# Patient Record
Sex: FEMALE | URBAN - METROPOLITAN AREA
[De-identification: names, ages, dates, MRNs, and addresses within clinical notes are randomized per-mention and may not be internally consistent; named-entity substitution may affect disease eponyms.]

---

## 2020-06-26 ENCOUNTER — APPOINTMENT (RX ONLY)
Dept: URBAN - METROPOLITAN AREA CLINIC 66 | Facility: CLINIC | Age: 70
Setting detail: DERMATOLOGY
End: 2020-06-26

## 2020-06-26 DIAGNOSIS — L82.1 OTHER SEBORRHEIC KERATOSIS: ICD-10-CM

## 2020-06-26 DIAGNOSIS — L85.3 XEROSIS CUTIS: ICD-10-CM

## 2020-06-26 DIAGNOSIS — Z71.89 OTHER SPECIFIED COUNSELING: ICD-10-CM

## 2020-06-26 DIAGNOSIS — L81.4 OTHER MELANIN HYPERPIGMENTATION: ICD-10-CM

## 2020-06-26 DIAGNOSIS — D18.0 HEMANGIOMA: ICD-10-CM

## 2020-06-26 PROBLEM — D18.01 HEMANGIOMA OF SKIN AND SUBCUTANEOUS TISSUE: Status: ACTIVE | Noted: 2020-06-26

## 2020-06-26 PROCEDURE — ? FULL BODY SKIN EXAM

## 2020-06-26 PROCEDURE — 99203 OFFICE O/P NEW LOW 30 MIN: CPT

## 2020-06-26 PROCEDURE — ? COUNSELING

## 2020-06-26 ASSESSMENT — LOCATION DETAILED DESCRIPTION DERM
LOCATION DETAILED: LEFT SUPERIOR MEDIAL MIDBACK
LOCATION DETAILED: LEFT INFERIOR MEDIAL UPPER BACK
LOCATION DETAILED: LEFT MEDIAL UPPER BACK
LOCATION DETAILED: LEFT INFERIOR MEDIAL MIDBACK

## 2020-06-26 ASSESSMENT — LOCATION SIMPLE DESCRIPTION DERM
LOCATION SIMPLE: LEFT LOWER BACK
LOCATION SIMPLE: LEFT UPPER BACK

## 2020-06-26 ASSESSMENT — LOCATION ZONE DERM: LOCATION ZONE: TRUNK

## 2020-06-26 NOTE — PROCEDURE: MIPS QUALITY
Quality 226: Preventive Care And Screening: Tobacco Use: Screening And Cessation Intervention: Patient screened for tobacco use and is an ex/non-smoker
Detail Level: Detailed
Quality 111:Pneumonia Vaccination Status For Older Adults: Pneumococcal Vaccination Previously Received
Quality 110: Preventive Care And Screening: Influenza Immunization: Influenza Immunization Administered during Influenza season
Quality 431: Preventive Care And Screening: Unhealthy Alcohol Use - Screening: Patient screened for unhealthy alcohol use using a single question and scores less than 2 times per year
Quality 130: Documentation Of Current Medications In The Medical Record: Current Medications Documented

## 2021-10-15 ENCOUNTER — APPOINTMENT (RX ONLY)
Dept: URBAN - METROPOLITAN AREA CLINIC 66 | Facility: CLINIC | Age: 71
Setting detail: DERMATOLOGY
End: 2021-10-15

## 2021-10-15 DIAGNOSIS — Z71.89 OTHER SPECIFIED COUNSELING: ICD-10-CM

## 2021-10-15 DIAGNOSIS — D18.0 HEMANGIOMA: ICD-10-CM

## 2021-10-15 DIAGNOSIS — L81.4 OTHER MELANIN HYPERPIGMENTATION: ICD-10-CM

## 2021-10-15 DIAGNOSIS — L82.1 OTHER SEBORRHEIC KERATOSIS: ICD-10-CM

## 2021-10-15 DIAGNOSIS — L85.3 XEROSIS CUTIS: ICD-10-CM

## 2021-10-15 PROBLEM — D18.01 HEMANGIOMA OF SKIN AND SUBCUTANEOUS TISSUE: Status: ACTIVE | Noted: 2021-10-15

## 2021-10-15 PROCEDURE — 99213 OFFICE O/P EST LOW 20 MIN: CPT

## 2021-10-15 PROCEDURE — ? TREATMENT REGIMEN

## 2021-10-15 PROCEDURE — ? FULL BODY SKIN EXAM

## 2021-10-15 PROCEDURE — ? COUNSELING

## 2021-10-15 ASSESSMENT — LOCATION SIMPLE DESCRIPTION DERM
LOCATION SIMPLE: LEFT UPPER BACK
LOCATION SIMPLE: LEFT LOWER BACK

## 2021-10-15 ASSESSMENT — LOCATION DETAILED DESCRIPTION DERM
LOCATION DETAILED: LEFT SUPERIOR MEDIAL MIDBACK
LOCATION DETAILED: LEFT INFERIOR MEDIAL MIDBACK
LOCATION DETAILED: LEFT INFERIOR MEDIAL UPPER BACK
LOCATION DETAILED: LEFT MEDIAL UPPER BACK

## 2021-10-15 ASSESSMENT — LOCATION ZONE DERM: LOCATION ZONE: TRUNK

## 2021-10-15 NOTE — HPI: EVALUATION OF SKIN LESION(S)
What Type Of Note Output Would You Prefer (Optional)?: Standard Output
Hpi Title: Evaluation of Skin Lesions
Additional History: \\n\\nAnnual full body skin exam, patient has a history of actinic keratoses

## 2021-12-07 ENCOUNTER — APPOINTMENT (RX ONLY)
Dept: URBAN - METROPOLITAN AREA CLINIC 66 | Facility: CLINIC | Age: 71
Setting detail: DERMATOLOGY
End: 2021-12-07

## 2021-12-07 DIAGNOSIS — L259 CONTACT DERMATITIS AND OTHER ECZEMA, UNSPECIFIED CAUSE: ICD-10-CM | Status: INADEQUATELY CONTROLLED

## 2021-12-07 DIAGNOSIS — L81.4 OTHER MELANIN HYPERPIGMENTATION: ICD-10-CM

## 2021-12-07 DIAGNOSIS — D485 NEOPLASM OF UNCERTAIN BEHAVIOR OF SKIN: ICD-10-CM

## 2021-12-07 DIAGNOSIS — L85.3 XEROSIS CUTIS: ICD-10-CM

## 2021-12-07 DIAGNOSIS — L30.4 ERYTHEMA INTERTRIGO: ICD-10-CM

## 2021-12-07 PROBLEM — D48.5 NEOPLASM OF UNCERTAIN BEHAVIOR OF SKIN: Status: ACTIVE | Noted: 2021-12-07

## 2021-12-07 PROBLEM — L23.9 ALLERGIC CONTACT DERMATITIS, UNSPECIFIED CAUSE: Status: ACTIVE | Noted: 2021-12-07

## 2021-12-07 PROCEDURE — ? COUNSELING

## 2021-12-07 PROCEDURE — ? PRESCRIPTION

## 2021-12-07 PROCEDURE — ? FULL BODY SKIN EXAM - DECLINED

## 2021-12-07 PROCEDURE — 99214 OFFICE O/P EST MOD 30 MIN: CPT | Mod: 25

## 2021-12-07 PROCEDURE — ? TREATMENT REGIMEN

## 2021-12-07 PROCEDURE — 11102 TANGNTL BX SKIN SINGLE LES: CPT

## 2021-12-07 PROCEDURE — ? BIOPSY BY SHAVE METHOD

## 2021-12-07 RX ORDER — HYDROCORTISONE 25 MG/G
CREAM TOPICAL
Qty: 28.35 | Refills: 6 | Status: ERX | COMMUNITY
Start: 2021-12-07

## 2021-12-07 RX ORDER — NYSTATIN 100000 [USP'U]/G
OINTMENT TOPICAL
Qty: 30 | Refills: 6 | Status: ERX | COMMUNITY
Start: 2021-12-07

## 2021-12-07 RX ADMIN — NYSTATIN: 100000 OINTMENT TOPICAL at 00:00

## 2021-12-07 RX ADMIN — HYDROCORTISONE: 25 CREAM TOPICAL at 00:00

## 2021-12-07 ASSESSMENT — LOCATION SIMPLE DESCRIPTION DERM
LOCATION SIMPLE: GROIN
LOCATION SIMPLE: ABDOMEN
LOCATION SIMPLE: LEFT FOREHEAD
LOCATION SIMPLE: RIGHT CHEEK
LOCATION SIMPLE: LEFT CHEEK

## 2021-12-07 ASSESSMENT — LOCATION DETAILED DESCRIPTION DERM
LOCATION DETAILED: RIGHT CENTRAL MALAR CHEEK
LOCATION DETAILED: LEFT MEDIAL FOREHEAD
LOCATION DETAILED: RIGHT INFERIOR CENTRAL MALAR CHEEK
LOCATION DETAILED: LEFT SUPRAPUBIC SKIN
LOCATION DETAILED: PERIUMBILICAL SKIN
LOCATION DETAILED: LEFT INFERIOR MEDIAL MALAR CHEEK

## 2021-12-07 ASSESSMENT — LOCATION ZONE DERM
LOCATION ZONE: TRUNK
LOCATION ZONE: FACE

## 2021-12-07 NOTE — HPI: RASH
What Type Of Note Output Would You Prefer (Optional)?: Standard Output
How Severe Is Your Rash?: mild
Is This A New Presentation, Or A Follow-Up?: Rash
Additional History: PATIENT ALREADY RECEIVED COVID-19 VACCINE

## 2022-11-04 ENCOUNTER — APPOINTMENT (RX ONLY)
Dept: URBAN - METROPOLITAN AREA CLINIC 66 | Facility: CLINIC | Age: 72
Setting detail: DERMATOLOGY
End: 2022-11-04

## 2022-11-04 DIAGNOSIS — Z71.89 OTHER SPECIFIED COUNSELING: ICD-10-CM

## 2022-11-04 DIAGNOSIS — D18.0 HEMANGIOMA: ICD-10-CM | Status: STABLE

## 2022-11-04 DIAGNOSIS — L81.4 OTHER MELANIN HYPERPIGMENTATION: ICD-10-CM | Status: STABLE

## 2022-11-04 DIAGNOSIS — L82.1 OTHER SEBORRHEIC KERATOSIS: ICD-10-CM | Status: STABLE

## 2022-11-04 DIAGNOSIS — L85.3 XEROSIS CUTIS: ICD-10-CM | Status: STABLE

## 2022-11-04 DIAGNOSIS — D485 NEOPLASM OF UNCERTAIN BEHAVIOR OF SKIN: ICD-10-CM

## 2022-11-04 DIAGNOSIS — L82.0 INFLAMED SEBORRHEIC KERATOSIS: ICD-10-CM

## 2022-11-04 PROBLEM — D18.01 HEMANGIOMA OF SKIN AND SUBCUTANEOUS TISSUE: Status: ACTIVE | Noted: 2022-11-04

## 2022-11-04 PROBLEM — D48.5 NEOPLASM OF UNCERTAIN BEHAVIOR OF SKIN: Status: ACTIVE | Noted: 2022-11-04

## 2022-11-04 PROCEDURE — ? BIOPSY BY SHAVE METHOD

## 2022-11-04 PROCEDURE — ? TREATMENT REGIMEN

## 2022-11-04 PROCEDURE — ? LIQUID NITROGEN

## 2022-11-04 PROCEDURE — 17110 DESTRUCTION B9 LES UP TO 14: CPT

## 2022-11-04 PROCEDURE — 99213 OFFICE O/P EST LOW 20 MIN: CPT | Mod: 25

## 2022-11-04 PROCEDURE — ? FULL BODY SKIN EXAM

## 2022-11-04 PROCEDURE — 11102 TANGNTL BX SKIN SINGLE LES: CPT | Mod: 59

## 2022-11-04 PROCEDURE — ? COUNSELING

## 2022-11-04 PROCEDURE — ? SUNSCREEN RECOMMENDATIONS

## 2022-11-04 ASSESSMENT — LOCATION DETAILED DESCRIPTION DERM
LOCATION DETAILED: RIGHT INFERIOR LATERAL NECK
LOCATION DETAILED: LEFT INFERIOR MEDIAL UPPER BACK
LOCATION DETAILED: RIGHT CLAVICULAR NECK
LOCATION DETAILED: LEFT SUPERIOR MEDIAL MIDBACK
LOCATION DETAILED: LEFT INFERIOR LATERAL NECK
LOCATION DETAILED: LEFT MEDIAL UPPER BACK
LOCATION DETAILED: LEFT INFERIOR MEDIAL MIDBACK
LOCATION DETAILED: LEFT CLAVICULAR NECK
LOCATION DETAILED: LEFT LATERAL SUPERIOR CHEST

## 2022-11-04 ASSESSMENT — LOCATION SIMPLE DESCRIPTION DERM
LOCATION SIMPLE: RIGHT ANTERIOR NECK
LOCATION SIMPLE: LEFT UPPER BACK
LOCATION SIMPLE: LEFT ANTERIOR NECK
LOCATION SIMPLE: LEFT LOWER BACK
LOCATION SIMPLE: CHEST

## 2022-11-04 ASSESSMENT — LOCATION ZONE DERM
LOCATION ZONE: TRUNK
LOCATION ZONE: NECK

## 2022-11-04 NOTE — PROCEDURE: LIQUID NITROGEN
Render Post-Care Instructions In Note?: no
Show Aperture Variable?: Yes
Consent: The patient's consent was obtained including but not limited to risks of crusting, scabbing, blistering, scarring, darker or lighter pigmentary change, recurrence, incomplete removal and infection.
Spray Paint Text: The liquid nitrogen was applied to the skin utilizing a spray paint frosting technique.
Post-Care Instructions: I reviewed with the patient in detail post-care instructions. Patient is to wear sunprotection, and avoid picking at any of the treated lesions. Pt may apply Vaseline to crusted or scabbing areas.
Detail Level: Detailed
Medical Necessity Information: It is in your best interest to select a reason for this procedure from the list below. All of these items fulfill various CMS LCD requirements except the new and changing color options.
Medical Necessity Clause: This procedure was medically necessary because the lesions that were treated were:

## 2023-11-03 ENCOUNTER — APPOINTMENT (RX ONLY)
Dept: URBAN - METROPOLITAN AREA CLINIC 66 | Facility: CLINIC | Age: 73
Setting detail: DERMATOLOGY
End: 2023-11-03

## 2023-11-03 DIAGNOSIS — L82.1 OTHER SEBORRHEIC KERATOSIS: ICD-10-CM

## 2023-11-03 DIAGNOSIS — L85.3 XEROSIS CUTIS: ICD-10-CM

## 2023-11-03 DIAGNOSIS — D18.0 HEMANGIOMA: ICD-10-CM

## 2023-11-03 DIAGNOSIS — Z71.89 OTHER SPECIFIED COUNSELING: ICD-10-CM

## 2023-11-03 DIAGNOSIS — L81.4 OTHER MELANIN HYPERPIGMENTATION: ICD-10-CM

## 2023-11-03 PROBLEM — D18.01 HEMANGIOMA OF SKIN AND SUBCUTANEOUS TISSUE: Status: ACTIVE | Noted: 2023-11-03

## 2023-11-03 PROCEDURE — 99213 OFFICE O/P EST LOW 20 MIN: CPT

## 2023-11-03 PROCEDURE — ? COUNSELING

## 2023-11-03 PROCEDURE — ? FULL BODY SKIN EXAM

## 2023-11-03 PROCEDURE — ? TREATMENT REGIMEN

## 2023-11-03 ASSESSMENT — LOCATION SIMPLE DESCRIPTION DERM
LOCATION SIMPLE: LEFT UPPER BACK
LOCATION SIMPLE: LEFT LOWER BACK

## 2023-11-03 ASSESSMENT — LOCATION DETAILED DESCRIPTION DERM
LOCATION DETAILED: LEFT INFERIOR MEDIAL MIDBACK
LOCATION DETAILED: LEFT SUPERIOR MEDIAL MIDBACK
LOCATION DETAILED: LEFT MEDIAL UPPER BACK
LOCATION DETAILED: LEFT INFERIOR MEDIAL UPPER BACK

## 2023-11-03 ASSESSMENT — LOCATION ZONE DERM: LOCATION ZONE: TRUNK

## 2024-05-14 ENCOUNTER — APPOINTMENT (RX ONLY)
Age: 74
Setting detail: DERMATOLOGY
End: 2024-05-14

## 2024-05-14 ENCOUNTER — APPOINTMENT (OUTPATIENT)
Age: 74
Setting detail: DERMATOLOGY
End: 2024-05-14

## 2024-05-14 DIAGNOSIS — Z41.9 ENCOUNTER FOR PROCEDURE FOR PURPOSES OTHER THAN REMEDYING HEALTH STATE, UNSPECIFIED: ICD-10-CM

## 2024-05-14 PROCEDURE — ? DELUXE HYDRAFACIAL

## 2024-05-14 PROCEDURE — ? PRODUCT LINE (ZO SKIN HEALTH)

## 2024-05-14 PROCEDURE — ? PRODUCT LINE (SUNSCREENS)

## 2024-05-14 ASSESSMENT — LOCATION ZONE DERM
LOCATION ZONE: FACE
LOCATION ZONE: FACE

## 2024-05-14 ASSESSMENT — LOCATION SIMPLE DESCRIPTION DERM
LOCATION SIMPLE: LEFT FOREHEAD
LOCATION SIMPLE: LEFT CHEEK
LOCATION SIMPLE: RIGHT CHEEK
LOCATION SIMPLE: RIGHT CHEEK
LOCATION SIMPLE: INFERIOR FOREHEAD
LOCATION SIMPLE: LEFT CHEEK
LOCATION SIMPLE: INFERIOR FOREHEAD
LOCATION SIMPLE: LEFT FOREHEAD

## 2024-05-14 ASSESSMENT — LOCATION DETAILED DESCRIPTION DERM
LOCATION DETAILED: LEFT CENTRAL MALAR CHEEK
LOCATION DETAILED: INFERIOR MID FOREHEAD
LOCATION DETAILED: LEFT INFERIOR CENTRAL MALAR CHEEK
LOCATION DETAILED: INFERIOR MID FOREHEAD
LOCATION DETAILED: RIGHT CENTRAL MALAR CHEEK
LOCATION DETAILED: RIGHT INFERIOR CENTRAL MALAR CHEEK
LOCATION DETAILED: LEFT INFERIOR CENTRAL MALAR CHEEK
LOCATION DETAILED: LEFT INFERIOR MEDIAL FOREHEAD
LOCATION DETAILED: LEFT CENTRAL MALAR CHEEK
LOCATION DETAILED: RIGHT CENTRAL MALAR CHEEK
LOCATION DETAILED: LEFT INFERIOR MEDIAL FOREHEAD
LOCATION DETAILED: RIGHT INFERIOR CENTRAL MALAR CHEEK

## 2024-05-14 NOTE — PROCEDURE: DELUXE HYDRAFACIAL
Vacuum Pressure Low Setting (Will Not Render If Set To 0): 0
Indication: anti-aging
Solution: GlySal 7.5%
Tip: Hydropeel Tip, Teal
Solution Override
Treatment Number: 1
Comments: 1/6 package
Number Of Passes: 2
Consent: Written consent obtained, risks reviewed including but not limited to crusting, scabbing, blistering, scarring, darker or lighter pigmentary change, bruising, and/or incomplete response.
Location: face
Post-Care Instructions: I reviewed with the patient in detail post-care instructions. Patient should stay away from the sun and wear sun protection until treated areas are fully healed.
Procedure: Exfoliation
Solution: Beta-HD
Tip: Hydropeel Tip, Clear
Price (Use Numbers Only, No Special Characters Or $): 587
Tip Override

## 2024-05-14 NOTE — PROCEDURE: PRODUCT LINE (SUNSCREENS)
Product 72 Price (In Dollars - Numeric Only, No Special Characters Or $): 0.00
Product 1 Units: 0
Product 1 Application Directions: Apply QAM
Name Of Product 7: Colorscience SPF 50 Brush 3 pack
Name Of Product 21: BATOOL Gonzalez
Assigning Risk Information: Per AMA, level of risk is based upon consequences of the problem(s) addressed at the encounter when appropriately treated. Risk also includes medical decision making related to the need to initiate or forego further testing, treatment and/or hospitalization. Over the counter medication are assigned a risk level of low. Prescription medication management is assigned a risk level of moderate.
Product 12 Application Directions: Apply to entire face QAM
Name Of Product 10: Alastin Tinted SPF
Risk Of Complication Category: No MDM
Name Of Product 2: Colorscience Bronze blush
Name Of Product 13: ZO SPF Powder light
Product 4 Application Directions: Apply to entire face daily
Allow Plan To Count Towards E/M Coding: No (this will remove associated impression from E/M calculation)
Product 7 Application Directions: Apply to entire face QD PRN
Name Of Product 5: Colorscience Weinert
Product 10 Units: 1
Name Of Product 8: ZO Broad Spectrum SPF 50
Send Charges To Patient Encounter: No
Name Of Product 11: Christie Zhou SPF Powder
Name Of Product 3: Colorscience Total Eye
Name Of Product 14: Colorscience Flex SPF 50
Name Of Product 6: Colorscience Stick
Name Of Product 1: Colorscience SPF Shield Glow
Name Of Product 9: Colorscience Brush
Name Of Product 12: Alastin Hydrating SPF
Detail Level: Zone
Name Of Product 4: Colorscience DUO

## 2024-05-14 NOTE — PROCEDURE: PRODUCT LINE (ZO SKIN HEALTH)
Risk Of Complication Category: No MDM
Name Of Product 32: ZO Smart Tone SPF 50
Product 2 Price (In Dollars - Numeric Only, No Special Characters Or $): 0.00
Product 21 Units: 0
Product 34 Application Directions: Apply to entire face over DPD and right before SPF
Product 10 Application Directions: Apply to entire face BID as directed
Name Of Product 8: Exfoliating accelerator
Name Of Product 27: SPF Brush 40 medium
Name Of Product 35: Complex A+
Product 37 Application Directions: Apply BID
Product 13 Application Directions: Apply to entire face QHS as tolerated. Start with every other night.
Allow Plan To Count Towards E/M Coding: No (this will remove associated impression from E/M calculation)
Product 21 Application Directions: Swab BID after cleansing
Product 2 Units: 1
Product 29 Application Directions: Apply to entire face QAM and reapply as needed.
Name Of Product 38: ZO Sheer Fluid SPF 50
Name Of Product 11: Firming Serum
Product 2 Application Directions: Scrub 5 x a week after cleansing
Name Of Product 22: Eye Brightening
Product 16 Application Directions: Apply to entire face QHS as tolerated
Name Of Product 14: Dual Action Scrub
Name Of Product 30: Renewal cream
Product 24 Application Directions: Apply to entire face BID after cleansing
Product 18 Application Directions: Apply to entire face QAM
Send Charges To Patient Encounter: No
Product 5 Application Directions: Apply QAM
Name Of Product 3: Rozatrol
Name Of Product 17: Complexion renewal pads
Name Of Product 25: Hydrafirm / Brightening Eye Cream
Name Of Product 6: Daily Power Defense
Product 35 Application Directions: Apply to entire face QHS PRN
Name Of Product 19: Tretinoin 0.05% (20g)
Name Of Product 33: Complexion clearing mask
Name Of Product 9: Pigment HQ4% blending RX
Product 30 Application Directions: Apply to entire face at night alternating with Retinol
Name Of Product 36: Enzymatic peel
Name Of Product 28: Hydrating Cream
Product 14 Application Directions: Scrub Every other day after cleansing
Name Of Product 39: ZO Gel SPF 50
Name Of Product 12: Exfoliating polish travel size
Product 3 Application Directions: Apply to entire face BID after Daily Power Defense
Name Of Product 31: Wrinkle and Texture Repair
Name Of Product 15: Growth Factor Serum
Name Of Product 1: Gentle cleanser
Product 6 Application Directions: Apply to entire face twice daily after pads.
Product 25 Application Directions: Apply to periorbital region QHS
Name Of Product 4: Pigment control HQ4% RX
Product 19 Application Directions: Apply 2 x 3 nights a week as tolerated
Product 9 Application Directions: Apply HS
Product 33 Application Directions: 3 x week to T-zone
Product 22 Application Directions: Apply to eyelids and crows feet BID
Name Of Product 26: Gregoryve
Name Of Product 20: Retinol Brightener
Product 28 Application Directions: Apply to entire face QOS PRN alternating with Retin A
Name Of Product 7: Instant Pore refiner
Name Of Product 34: Illuminating AOX Serum
Product 36 Application Directions: Apply to entire face QHS
Detail Level: Zone
Name Of Product 10: Complexion Renewal Kit
Name Of Product 29: ZO Broad SPF 50
Name Of Product 23: Gentle cleanser travel size
Name Of Product 21: Oil Control Pads
Product 1 Application Directions: Wash BID
Product 15 Application Directions: Apply to face BID
Name Of Product 37: Eye Growth Factor Serum
Name Of Product 13: Radical night repair
Product 23 Application Directions: Cleanse face BID
Product 4 Application Directions: Apply to entire face for pigmentation QAM
Name Of Product 18: ZO SPF Primer
Name Of Product 40: Complexion Clarifying Serum
Name Of Product 2: Exfoliating polish
Assigning Risk Information: Per AMA, level of risk is based upon consequences of the problem(s) addressed at the encounter when appropriately treated. Risk also includes medical decision making related to the need to initiate or forego further testing, treatment and/or hospitalization. Over the counter medication are assigned a risk level of low. Prescription medication management is assigned a risk level of moderate.
Name Of Product 24: Calming toner
Name Of Product 16: Wrinkle Texture Repair
Name Of Product 41: Intense eye repair
Product 7 Application Directions: Apply to entire face BID
Name Of Product 5: Vitamin C serum
Product 26 Application Directions: Apply to entire face BID after pads

## 2024-05-14 NOTE — PROCEDURE: DELUXE HYDRAFACIAL
Vacuum Pressure Low Setting (Will Not Render If Set To 0): 0
Vacuum Pressure Low Setting (Will Not Render If Set To 0): 1
Tip: Hydropeel Tip, Clear
Solution Override
Tip Override
Solution: GlySal 7.5%
Procedure: Exfoliation
Solution: Beta-HD
Tip: Hydropeel Tip, Teal
Comments: 1/6 package
Price (Use Numbers Only, No Special Characters Or $): 835
Post-Care Instructions: I reviewed with the patient in detail post-care instructions. Patient should stay away from the sun and wear sun protection until treated areas are fully healed.
Indication: anti-aging
Location: face
Number Of Passes: 2
Consent: Written consent obtained, risks reviewed including but not limited to crusting, scabbing, blistering, scarring, darker or lighter pigmentary change, bruising, and/or incomplete response.

## 2024-05-14 NOTE — PROCEDURE: PRODUCT LINE (ZO SKIN HEALTH)
Name Of Product 34: Illuminating AOX Serum
Name Of Product 9: Pigment HQ4% blending RX
Product 16 Price (In Dollars - Numeric Only, No Special Characters Or $): 0.00
Name Of Product 22: Eye Brightening
Name Of Product 28: Hydrating Cream
Send Charges To Patient Encounter: No
Product 28 Units: 0
Name Of Product 13: Radical night repair
Product 29 Application Directions: Apply to entire face QAM and reapply as needed.
Product 1 Application Directions: Wash BID
Product 2 Application Directions: Scrub 5 x a week after cleansing
Product 30 Application Directions: Apply to entire face at night alternating with Retinol
Product 19 Application Directions: Apply 2 x 3 nights a week as tolerated
Product 27 Application Directions: Apply to entire face QAM
Risk Of Complication Category: No MDM
Name Of Product 39: ZO Gel SPF 50
Detail Level: Zone
Product 26 Application Directions: Apply to entire face BID after pads
Product 8 Application Directions: Apply QAM
Name Of Product 40: Complexion Clarifying Serum
Name Of Product 8: Exfoliating accelerator
Product 23 Application Directions: Cleanse face BID
Name Of Product 37: Eye Growth Factor Serum
Name Of Product 12: Exfoliating polish travel size
Name Of Product 19: Tretinoin 0.05% (20g)
Name Of Product 30: Renewal cream
Product 22 Application Directions: Apply to eyelids and crows feet BID
Product 33 Application Directions: 3 x week to T-zone
Product 4 Application Directions: Apply to entire face for pigmentation QAM
Name Of Product 24: Calming toner
Name Of Product 31: Wrinkle and Texture Repair
Product 24 Application Directions: Apply to entire face BID after cleansing
Product 2 Units: 1
Name Of Product 5: Vitamin C serum
Name Of Product 14: Dual Action Scrub
Product 21 Application Directions: Swab BID after cleansing
Product 11 Application Directions: Apply BID
Name Of Product 29: ZO Broad SPF 50
Product 31 Application Directions: Apply [facility]
Name Of Product 25: Hydrafirm / Brightening Eye Cream
Product 15 Application Directions: Apply to face BID
Product 25 Application Directions: Apply to periorbital region QHS
Name Of Product 36: Enzymatic peel
Name Of Product 2: Exfoliating polish
Name Of Product 4: Pigment control HQ4% RX
Name Of Product 3: Rozatrol
Name Of Product 38: ZO Sheer Fluid SPF 50
Name Of Product 21: Oil Control Pads
Name Of Product 7: Instant Pore refiner
Name Of Product 10: Complexion Renewal Kit
Product 10 Application Directions: Apply to entire face BID as directed
Name Of Product 17: Complexion renewal pads
Name Of Product 1: Gentle cleanser
Product 36 Application Directions: Apply to entire face QHS
Name Of Product 16: Wrinkle Texture Repair
Product 6 Application Directions: Apply to entire face twice daily after pads.
Name Of Product 23: Gentle cleanser travel size
Product 28 Application Directions: Apply to entire face QOS PRN alternating with Retin A
Assigning Risk Information: Per AMA, level of risk is based upon consequences of the problem(s) addressed at the encounter when appropriately treated. Risk also includes medical decision making related to the need to initiate or forego further testing, treatment and/or hospitalization. Over the counter medication are assigned a risk level of low. Prescription medication management is assigned a risk level of moderate.
Product 35 Application Directions: Apply to entire face QHS PRN
Product 13 Application Directions: Apply to entire face QHS as tolerated. Start with every other night.
Allow Plan To Count Towards E/M Coding: No (this will remove associated impression from E/M calculation)
Name Of Product 35: Complex A+
Name Of Product 32: ZO Smart Tone SPF 50
Name Of Product 26: Eduardove
Product 16 Application Directions: Apply to entire face QHS as tolerated
Product 14 Application Directions: Scrub Every other day after cleansing
Name Of Product 6: Daily Power Defense
Name Of Product 11: Firming Serum
Product 3 Application Directions: Apply to entire face BID after Daily Power Defense
Name Of Product 15: Growth Factor Serum
Product 34 Application Directions: Apply to entire face over DPD and right before SPF
Name Of Product 20: Retinol Brightener
Name Of Product 33: Complexion clearing mask
Name Of Product 18: ZO SPF Primer
Name Of Product 41: Intense eye repair
Name Of Product 27: SPF Brush 40 medium
Product 7 Application Directions: Apply to entire face BID

## 2024-05-14 NOTE — PROCEDURE: PRODUCT LINE (SUNSCREENS)
Product 26 Price (In Dollars - Numeric Only, No Special Characters Or $): 0.00
Product 28 Units: 0
Name Of Product 2: Colorscience Bronze blush
Detail Level: Zone
Name Of Product 21: MCKINLEY Rick
Assigning Risk Information: Per AMA, level of risk is based upon consequences of the problem(s) addressed at the encounter when appropriately treated. Risk also includes medical decision making related to the need to initiate or forego further testing, treatment and/or hospitalization. Over the counter medication are assigned a risk level of low. Prescription medication management is assigned a risk level of moderate.
Name Of Product 1: Colorscience SPF Shield Glow
Name Of Product 6: Colorscience Stick
Name Of Product 11: Julian Ceron SPF Powder
Name Of Product 10: Alastin Tinted SPF
Product 1 Application Directions: Apply QAM
Render Product Pricing In Note: No
Product 10 Application Directions: Apply to entire face QAM
Product 10 Units: 1
Name Of Product 7: Colorscience SPF 50 Brush 3 pack
Name Of Product 12: Alastin Hydrating SPF
Product 7 Application Directions: Apply to entire face QD PRN
Name Of Product 9: Colorscience Brush
Name Of Product 4: Colorscience DUO
Name Of Product 8: ZO Broad Spectrum SPF 50
Name Of Product 14: Colorscience Flex SPF 50
Allow Plan To Count Towards E/M Coding: No (this will remove associated impression from E/M calculation)
Risk Of Complication Category: No MDM
Name Of Product 13: ZO SPF Powder light
Name Of Product 5: Colorscience [place]
Product 4 Application Directions: Apply to entire face daily
Name Of Product 3: Colorscience Total Eye

## 2024-08-20 ENCOUNTER — APPOINTMENT (RX ONLY)
Age: 74
Setting detail: DERMATOLOGY
End: 2024-08-20

## 2024-08-20 ENCOUNTER — APPOINTMENT (OUTPATIENT)
Age: 74
Setting detail: DERMATOLOGY
End: 2024-08-20

## 2024-08-20 DIAGNOSIS — Z41.9 ENCOUNTER FOR PROCEDURE FOR PURPOSES OTHER THAN REMEDYING HEALTH STATE, UNSPECIFIED: ICD-10-CM

## 2024-08-20 PROCEDURE — ? PRODUCT LINE (ZO SKIN HEALTH)

## 2024-08-20 PROCEDURE — ? DELUXE HYDRAFACIAL

## 2024-08-20 ASSESSMENT — LOCATION DETAILED DESCRIPTION DERM
LOCATION DETAILED: LEFT INFERIOR CENTRAL MALAR CHEEK
LOCATION DETAILED: LEFT LOWER CUTANEOUS LIP
LOCATION DETAILED: LEFT LOWER CUTANEOUS LIP
LOCATION DETAILED: LEFT CENTRAL MALAR CHEEK
LOCATION DETAILED: LEFT INFERIOR MEDIAL FOREHEAD
LOCATION DETAILED: LEFT INFERIOR MEDIAL FOREHEAD
LOCATION DETAILED: LEFT CENTRAL MALAR CHEEK
LOCATION DETAILED: RIGHT CENTRAL MALAR CHEEK
LOCATION DETAILED: RIGHT CENTRAL MALAR CHEEK
LOCATION DETAILED: LEFT INFERIOR CENTRAL MALAR CHEEK

## 2024-08-20 ASSESSMENT — LOCATION SIMPLE DESCRIPTION DERM
LOCATION SIMPLE: RIGHT CHEEK
LOCATION SIMPLE: LEFT FOREHEAD
LOCATION SIMPLE: LEFT FOREHEAD
LOCATION SIMPLE: LEFT LIP
LOCATION SIMPLE: RIGHT CHEEK
LOCATION SIMPLE: LEFT CHEEK
LOCATION SIMPLE: LEFT CHEEK
LOCATION SIMPLE: LEFT LIP

## 2024-08-20 ASSESSMENT — LOCATION ZONE DERM
LOCATION ZONE: FACE
LOCATION ZONE: LIP
LOCATION ZONE: LIP
LOCATION ZONE: FACE

## 2024-08-20 NOTE — PROCEDURE: PRODUCT LINE (ZO SKIN HEALTH)
Product 19 Price (In Dollars - Numeric Only, No Special Characters Or $): 0.00
Name Of Product 3: Rozatrol
Name Of Product 38: ZO Sheer Fluid SPF 50
Product 29 Units: 0
Name Of Product 10: Complexion Renewal Kit
Product 32 Application Directions: Apply to entire face QAM
Name Of Product 4: Pigment control HQ4% RX
Product 29 Application Directions: Apply to entire face QAM and reapply as needed.
Name Of Product 7: Instant Pore refiner
Send Charges To Patient Encounter: No
Name Of Product 33: Complexion clearing mask
Product 26 Application Directions: Apply to entire face BID after pads
Name Of Product 30: Renewal cream
Product 19 Application Directions: Apply 2 x 3 nights a week as tolerated
Product 23 Application Directions: Cleanse face BID
Name Of Product 27: SPF Brush 40 medium
Product 16 Application Directions: Apply to entire face QHS as tolerated
Name Of Product 20: Retinol Brightener
Product 3 Application Directions: Apply to entire face BID after Daily Power Defense
Product 13 Application Directions: Apply to entire face QHS as tolerated. Start with every other night.
Name Of Product 24: Calming toner
Name Of Product 17: Complexion renewal pads
Product 10 Application Directions: Apply to entire face BID as directed
Name Of Product 42: Anti aging Program level 2
Product 7 Application Directions: Apply to entire face BID
Product 4 Application Directions: Apply to entire face for pigmentation QAM
Name Of Product 39: ZO Gel SPF 50
Name Of Product 14: Dual Action Scrub
Name Of Product 36: Enzymatic peel
Name Of Product 1: Exfoliating cleanser
Name Of Product 11: Firming Serum
Product 33 Application Directions: 3 x week to T-zone
Product 30 Application Directions: Apply to entire face at night alternating with Retinol
Name Of Product 5: Vitamin C serum
Name Of Product 8: Exfoliation accelerator
Name Of Product 34: Illuminating AOX Serum
Product 20 Application Directions: Apply to entire face every 2-3 nights
Name Of Product 31: Wrinkle and Texture Repair
Product 24 Application Directions: Apply to entire face BID after cleansing
Name Of Product 28: Hydrating Cream
Product 11 Units: 1
Product 21 Application Directions: Swab BID after cleansing
Product 14 Application Directions: Scrub every day or every other day after cleansing in the morning
Name Of Product 21: Oil Control Pads
Name Of Product 25: Hydrafirm / Brightening Eye Cream
Detail Level: Zone
Product 36 Application Directions: Apply to entire face QHS
Product 11 Application Directions: Apply BID
Name Of Product 18: ZO SPF Primer
Product 1 Application Directions: Wash BID
Name Of Product 22: Eye Brightening
Name Of Product 15: Growth Factor Serum
Name Of Product 40: Complexion Clarifying Serum
Name Of Product 2: Exfoliating polish
Product 5 Application Directions: Apply QAM
Name Of Product 37: Eye Growth Factor Serum
Name Of Product 12: Exfoliating polish travel size
Product 34 Application Directions: Apply to entire face over DPD and right before SPF
Product 31 Application Directions: Apply hospitals
Assigning Risk Information: Per AMA, level of risk is based upon consequences of the problem(s) addressed at the encounter when appropriately treated. Risk also includes medical decision making related to the need to initiate or forego further testing, treatment and/or hospitalization. Over the counter medication are assigned a risk level of low. Prescription medication management is assigned a risk level of moderate.
Name Of Product 9: Pigment HQ4% blending RX
Product 28 Application Directions: Apply to entire face QOS PRN alternating with Retin A
Name Of Product 6: Daily Power Defense
Product 25 Application Directions: Apply to periorbital region QHS
Name Of Product 35: Complex A+
Name Of Product 32: ZO Smart Tone SPF 50
Risk Of Complication Category: No MDM
Product 22 Application Directions: Apply to eyelids and crows feet BID
Name Of Product 29: ZO Broad SPF 50
Product 15 Application Directions: Apply to face BID
Name Of Product 26: Gregoryve
Product 40 Application Directions: Apply to face BID after pads
Product 2 Application Directions: Scrub 5 x a week after cleansing
Allow Plan To Count Towards E/M Coding: No (this will remove associated impression from E/M calculation)
Name Of Product 19: Tretinoin 0.05% (20g)
Name Of Product 23: Gentle cleanser travel size
Name Of Product 16: Wrinkle Texture Repair
Product 6 Application Directions: Apply to entire face twice daily after pads.
Name Of Product 41: Intense eye repair
Product 35 Application Directions: Apply to entire face QHS PRN
Name Of Product 13: Radical night repair

## 2024-08-20 NOTE — PROCEDURE: DELUXE HYDRAFACIAL
Tip Override
Post-Care Instructions: I reviewed with the patient in detail post-care instructions. Patient should stay away from the sun and wear sun protection until treated areas are fully healed.
Vacuum Pressure Low Setting (Will Not Render If Set To 0): 0
Solution: GlySal 7.5%
Procedure: Exfoliation
Tip: Hydropeel Tip, Clear
Tip: Hydropeel Tip, Teal
Price (Use Numbers Only, No Special Characters Or $): 195
Indication: anti-aging
Location: face
Vacuum Pressure Low Setting (Will Not Render If Set To 0): 1
Solution Override
Consent: Written consent obtained, risks reviewed including but not limited to crusting, scabbing, blistering, scarring, darker or lighter pigmentary change, bruising, and/or incomplete response.
Solution: Beta-HD
Number Of Passes: 2

## 2024-08-20 NOTE — PROCEDURE: PRODUCT LINE ([facility])
Name Of Product 10: Complexion Renewal Kit
Product 8 Units: 0
Name Of Product 39: ZO Gel SPF 50
Product 26 Application Directions: Apply to entire face BID after pads
Name Of Product 22: Eye Brightening
Product 29 Application Directions: Apply to entire face QAM and reapply as needed.
Name Of Product 5: Vitamin C serum
Product 17 Price (In Dollars - Numeric Only, No Special Characters Or $): 0.00
Product 20 Application Directions: Apply to entire face every 2-3 nights
Name Of Product 9: Pigment HQ4% blending RX
Name Of Product 26: Eduardove
Product 11 Application Directions: Apply BID
Name Of Product 17: Complexion renewal pads
Name Of Product 8: Exfoliation accelerator
Name Of Product 15: Growth Factor Serum
Product 13 Application Directions: Apply to entire face QHS as tolerated. Start with every other night.
Name Of Product 12: Exfoliating polish travel size
Assigning Risk Information: Per AMA, level of risk is based upon consequences of the problem(s) addressed at the encounter when appropriately treated. Risk also includes medical decision making related to the need to initiate or forego further testing, treatment and/or hospitalization. Over the counter medication are assigned a risk level of low. Prescription medication management is assigned a risk level of moderate.
Send Charges To Patient Encounter: No
Name Of Product 2: Exfoliating polish
Name Of Product 41: Intense eye repair
Product 10 Application Directions: Apply to entire face BID as directed
Name Of Product 4: Pigment control HQ4% RX
Product 30 Application Directions: Apply to entire face at night alternating with Retinol
Name Of Product 19: Tretinoin 0.05% (20g)
Name Of Product 40: Complexion Clarifying Serum
Product 2 Application Directions: Scrub 5 x a week after cleansing
Name Of Product 27: SPF Brush 40 medium
Product 25 Application Directions: Apply to periorbital region QHS
Name Of Product 32: ZO Smart Tone SPF 50
Product 36 Application Directions: Apply to entire face QHS
Name Of Product 38: ZO Sheer Fluid SPF 50
Product 7 Application Directions: Apply to entire face BID
Name Of Product 24: Calming toner
Product 31 Application Directions: Apply [facility]
Product 18 Application Directions: Apply to entire face QAM
Name Of Product 23: Gentle cleanser travel size
Product 3 Application Directions: Apply to entire face BID after Daily Power Defense
Name Of Product 6: Daily Power Defense
Product 23 Application Directions: Cleanse face BID
Product 21 Application Directions: Swab BID after cleansing
Name Of Product 33: Complexion clearing mask
Product 4 Application Directions: Apply to entire face for pigmentation QAM
Name Of Product 31: Wrinkle and Texture Repair
Name Of Product 28: Hydrating Cream
Name Of Product 1: Exfoliating cleanser
Name Of Product 13: Radical night repair
Name Of Product 37: Eye Growth Factor Serum
Product 19 Application Directions: Apply 2 x 3 nights a week as tolerated
Product 15 Application Directions: Apply to face BID
Name Of Product 14: Dual Action Scrub
Name Of Product 21: Oil Control Pads
Name Of Product 42: Anti aging Program level 2
Product 40 Application Directions: Apply to face BID after pads
Product 28 Application Directions: Apply to entire face QOS PRN alternating with Retin A
Name Of Product 29: ZO Broad SPF 50
Product 11 Units: 1
Name Of Product 35: Complex A+
Allow Plan To Count Towards E/M Coding: No (this will remove associated impression from E/M calculation)
Detail Level: Zone
Product 1 Application Directions: Wash BID
Risk Of Complication Category: No MDM
Name Of Product 16: Wrinkle Texture Repair
Product 16 Application Directions: Apply to entire face QHS as tolerated
Product 24 Application Directions: Apply to entire face BID after cleansing
Product 14 Application Directions: Scrub every day or every other day after cleansing in the morning
Name Of Product 3: Rozatrol
Name Of Product 20: Retinol Brightener
Name Of Product 11: Firming Serum
Product 6 Application Directions: Apply to entire face twice daily after pads.
Name Of Product 7: Instant Pore refiner
Name Of Product 18: ZO SPF Primer
Name Of Product 30: Renewal cream
Name Of Product 36: Enzymatic peel
Name Of Product 25: Hydrafirm / Brightening Eye Cream
Product 34 Application Directions: Apply to entire face over DPD and right before SPF
Product 33 Application Directions: 3 x week to T-zone
Product 35 Application Directions: Apply to entire face QHS PRN
Product 5 Application Directions: Apply QAM
Name Of Product 34: Illuminating AOX Serum
Product 22 Application Directions: Apply to eyelids and crows feet BID

## 2024-08-20 NOTE — PROCEDURE: DELUXE HYDRAFACIAL
Vacuum Pressure Low Setting (Will Not Render If Set To 0): 0
Indication: anti-aging
Solution: GlySal 7.5%
Tip: Hydropeel Tip, Teal
Solution Override
Number Of Passes: 2
Consent: Written consent obtained, risks reviewed including but not limited to crusting, scabbing, blistering, scarring, darker or lighter pigmentary change, bruising, and/or incomplete response.
Vacuum Pressure Low Setting (Will Not Render If Set To 0): 1
Location: face
Post-Care Instructions: I reviewed with the patient in detail post-care instructions. Patient should stay away from the sun and wear sun protection until treated areas are fully healed.
Procedure: Exfoliation
Solution: Beta-HD
Tip: Hydropeel Tip, Clear
Price (Use Numbers Only, No Special Characters Or $): 195
Tip Override

## 2024-11-22 ENCOUNTER — APPOINTMENT (RX ONLY)
Dept: URBAN - METROPOLITAN AREA CLINIC 66 | Facility: CLINIC | Age: 74
Setting detail: DERMATOLOGY
End: 2024-11-22

## 2024-11-22 DIAGNOSIS — L82.0 INFLAMED SEBORRHEIC KERATOSIS: ICD-10-CM

## 2024-11-22 DIAGNOSIS — L82.1 OTHER SEBORRHEIC KERATOSIS: ICD-10-CM

## 2024-11-22 DIAGNOSIS — Z71.89 OTHER SPECIFIED COUNSELING: ICD-10-CM

## 2024-11-22 DIAGNOSIS — D18.0 HEMANGIOMA: ICD-10-CM

## 2024-11-22 DIAGNOSIS — L81.4 OTHER MELANIN HYPERPIGMENTATION: ICD-10-CM

## 2024-11-22 DIAGNOSIS — L85.3 XEROSIS CUTIS: ICD-10-CM

## 2024-11-22 PROBLEM — D18.01 HEMANGIOMA OF SKIN AND SUBCUTANEOUS TISSUE: Status: ACTIVE | Noted: 2024-11-22

## 2024-11-22 PROCEDURE — ? DEFER

## 2024-11-22 PROCEDURE — ? COUNSELING

## 2024-11-22 PROCEDURE — ? TREATMENT REGIMEN

## 2024-11-22 PROCEDURE — ? FULL BODY SKIN EXAM

## 2024-11-22 PROCEDURE — 99213 OFFICE O/P EST LOW 20 MIN: CPT

## 2024-11-22 ASSESSMENT — LOCATION SIMPLE DESCRIPTION DERM
LOCATION SIMPLE: LEFT FOREARM
LOCATION SIMPLE: LEFT UPPER BACK
LOCATION SIMPLE: RIGHT UPPER BACK
LOCATION SIMPLE: LEFT LOWER BACK
LOCATION SIMPLE: RIGHT FOREARM
LOCATION SIMPLE: ABDOMEN

## 2024-11-22 ASSESSMENT — LOCATION DETAILED DESCRIPTION DERM
LOCATION DETAILED: RIGHT PROXIMAL DORSAL FOREARM
LOCATION DETAILED: LEFT INFERIOR MEDIAL UPPER BACK
LOCATION DETAILED: SUBXIPHOID
LOCATION DETAILED: LEFT PROXIMAL DORSAL FOREARM
LOCATION DETAILED: LEFT INFERIOR MEDIAL MIDBACK
LOCATION DETAILED: LEFT MEDIAL UPPER BACK
LOCATION DETAILED: RIGHT RIB CAGE
LOCATION DETAILED: LEFT SUPERIOR MEDIAL MIDBACK
LOCATION DETAILED: RIGHT INFERIOR MEDIAL UPPER BACK

## 2024-11-22 ASSESSMENT — LOCATION ZONE DERM
LOCATION ZONE: ARM
LOCATION ZONE: TRUNK

## 2025-03-27 ENCOUNTER — APPOINTMENT (OUTPATIENT)
Age: 75
Setting detail: DERMATOLOGY
End: 2025-03-27

## 2025-03-27 DIAGNOSIS — Z41.9 ENCOUNTER FOR PROCEDURE FOR PURPOSES OTHER THAN REMEDYING HEALTH STATE, UNSPECIFIED: ICD-10-CM

## 2025-03-27 PROCEDURE — ? DELUXE HYDRAFACIAL

## 2025-03-27 PROCEDURE — ? INVENTORY

## 2025-03-27 ASSESSMENT — LOCATION DETAILED DESCRIPTION DERM
LOCATION DETAILED: RIGHT CENTRAL MALAR CHEEK
LOCATION DETAILED: RIGHT INFERIOR MEDIAL FOREHEAD
LOCATION DETAILED: LEFT CENTRAL MALAR CHEEK
LOCATION DETAILED: LEFT INFERIOR MEDIAL FOREHEAD
LOCATION DETAILED: LEFT INFERIOR CENTRAL MALAR CHEEK

## 2025-03-27 ASSESSMENT — LOCATION SIMPLE DESCRIPTION DERM
LOCATION SIMPLE: RIGHT FOREHEAD
LOCATION SIMPLE: LEFT CHEEK
LOCATION SIMPLE: RIGHT CHEEK
LOCATION SIMPLE: LEFT FOREHEAD

## 2025-03-27 ASSESSMENT — LOCATION ZONE DERM: LOCATION ZONE: FACE

## 2025-06-12 ENCOUNTER — APPOINTMENT (OUTPATIENT)
Age: 75
Setting detail: DERMATOLOGY
End: 2025-06-12

## 2025-06-12 DIAGNOSIS — Z41.9 ENCOUNTER FOR PROCEDURE FOR PURPOSES OTHER THAN REMEDYING HEALTH STATE, UNSPECIFIED: ICD-10-CM

## 2025-06-12 PROCEDURE — ? SIGNATURE HYDRAFACIAL

## 2025-06-12 PROCEDURE — ? INVENTORY

## 2025-06-12 PROCEDURE — ? DERMAPLANE

## 2025-06-12 ASSESSMENT — LOCATION SIMPLE DESCRIPTION DERM
LOCATION SIMPLE: LEFT FOREHEAD
LOCATION SIMPLE: SUPERIOR FOREHEAD
LOCATION SIMPLE: RIGHT CHEEK
LOCATION SIMPLE: LEFT CHEEK
LOCATION SIMPLE: RIGHT FOREHEAD

## 2025-06-12 ASSESSMENT — LOCATION ZONE DERM: LOCATION ZONE: FACE

## 2025-06-12 ASSESSMENT — LOCATION DETAILED DESCRIPTION DERM
LOCATION DETAILED: RIGHT INFERIOR CENTRAL MALAR CHEEK
LOCATION DETAILED: SUPERIOR MID FOREHEAD
LOCATION DETAILED: RIGHT MEDIAL FOREHEAD
LOCATION DETAILED: LEFT INFERIOR MEDIAL FOREHEAD
LOCATION DETAILED: LEFT INFERIOR CENTRAL MALAR CHEEK
LOCATION DETAILED: LEFT MEDIAL MALAR CHEEK
LOCATION DETAILED: RIGHT CENTRAL MALAR CHEEK

## 2025-06-12 NOTE — PROCEDURE: DERMAPLANE
Pre-Procedure Text: The patient was placed in a recumbant position on the procedure table.
Post-Care Instructions: I reviewed with the patient in detail post-care instructions.
Blade: 14 blade scalpel
Detail Level: Zone
Treatment Areas: face and neck
Post-Procedure Instructions: Following the dermaplane procedure, Oxymist treatment was applied to the treatment areas. Moisturizer and SPF was applied.
Price (Use Numbers Only, No Special Characters Or $): 50
Treatment Area Prep Override: cleaning and hot towel

## 2025-06-12 NOTE — PROCEDURE: SIGNATURE HYDRAFACIAL
Tip Override
Tip: Hydropeel Tip, Teal
Vacuum Pressure High Setting (Will Not Render If Set To 0): 0
Solution Override
Indication: skin texture
Solution: GlySal 7.5%
Solution: Beta-HD
Location: face
Consent: Written consent obtained, risks reviewed including but not limited to crusting, scabbing, blistering, scarring, darker or lighter pigmentary change, bruising, and/or incomplete response.
Procedure: Exfoliation
Tip: Hydropeel Tip, Clear
Price (Use Numbers Only, No Special Characters Or $): 195
Tip: Hydropeel Tip, Blue
Post-Care Instructions: I reviewed with the patient in detail post-care instructions. Patient should stay away from the sun and wear sun protection until treated areas are fully healed.